# Patient Record
(demographics unavailable — no encounter records)

---

## 2024-10-24 NOTE — CARDIOLOGY SUMMARY
[de-identified] : nsr low voltage complexes October 24, 2024. [de-identified] : CT of the brain.  September 2024.  No acute event.

## 2024-10-24 NOTE — DISCUSSION/SUMMARY
[FreeTextEntry1] : 39-year-old with above medical history active medical problems as noted below 1.  Loss of consciousness.  Possible cardiovascular significantly.  Possible  migraine related event. EKG no significant conduction system abnormality.  No orthostasis.  Prior history of syncopal event in situation of bleeding.  She has baseline anemia.  And lower blood pressure. Possibility of vasovagal physiology along with migraine headache cannot be ruled out. At present we will rule out any significant cardiac arrhythmias with 2 weeks event monitor Echocardiogram for LV ejection fraction wall motion and ruling out any structural heart disease. Exercise treadmill stress test to assess evaluate blood pressure heart rate response and rule out any exercise related arrhythmias. Overall recommended Hydration Counter measure activity and exercise lying down when recurrence of similar symptoms and avoidance of situations which can stimulate were reviewed. Complete evaluation management by neurologist.  Based on about test we will discuss further if any other evaluation management needs to be done. Long-term preventive evaluation should include lipid panel if it is not done.  Counseling regarding low saturated fat,salt and carbohydrate intake was reviewed. Active lifestyle and regular exercise  along with weight management is advised. I have reviewed above at length. I answered all the questions. Patient verbalized understandings. Thank you very much for allowing me to participate in your patient's care. Please feel free to call me for any questions. Sincerely,  Kendy Jarrell MD, FACC, MARCE [EKG obtained to assist in diagnosis and management of assessed problem(s)] : EKG obtained to assist in diagnosis and management of assessed problem(s)

## 2024-10-24 NOTE — PHYSICAL EXAM
[Normal] : clear lung fields, good air entry, no respiratory distress [Normal Gait] : normal gait [No Edema] : no edema [Normal Speech] : normal speech [Alert and Oriented] : alert and oriented

## 2024-10-24 NOTE — ASSESSMENT
[FreeTextEntry1] : Reviewed on October 2024. EKG from today reviewed Labs from September 12, 2024.  CT scan of the brain from September 12, 2024 reviewed.  She had a normal CBC. Reviewed labs from October 7, 2024.  Anemia.  Sodium 140 potassium 3.8 creatinine 0.5.  SGOT PT 18 and 13.

## 2024-10-24 NOTE — CARDIOLOGY SUMMARY
[de-identified] : nsr low voltage complexes October 24, 2024. [de-identified] : CT of the brain.  September 2024.  No acute event.
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
## 2024-12-12 NOTE — CARDIOLOGY SUMMARY
[de-identified] : nsr low voltage complexes October 24, 2024. [de-identified] : Event monitor.  14 days.  October 2024.  Normal sinus rhythm.  Rare ectopy.  Brief PAT for 2 seconds.  Symptoms were related to sinus tachycardia at 111 average heart rate 76 minimum 48 [de-identified] : Exercise treadmill stress test 12/9/2024 9 minutes of Lele protocol 10 METS of workload.  No chest pain.  Negative for ischemia. [de-identified] : 11/29/2024 EF 60 to 65% mild MR. [de-identified] : CT of the brain.  September 2024.  No acute event.

## 2024-12-12 NOTE — ASSESSMENT
[FreeTextEntry1] : Reviewed on October 2024. EKG from today reviewed Labs from September 12, 2024.  CT scan of the brain from September 12, 2024 reviewed.  She had a normal CBC. Reviewed labs from October 7, 2024.  Anemia.  Sodium 140 potassium 3.8 creatinine 0.5.  SGOT PT 18 and 13.  Reviewed on December 12, 2024.  Event monitor, echocardiogram, exercise treadmill stress test were all reviewed.

## 2024-12-12 NOTE — DISCUSSION/SUMMARY
[FreeTextEntry1] : 39-year-old with above medical history active medical problems as noted below 1.  Loss of consciousness.   Syncope. EKG no significant conduction system abnormality.  No orthostasis.  Prior history of syncopal event in situation of bleeding.  She has baseline anemia.  And lower blood pressure.  Now with preserved EF.  Nonischemic exercise treadmill stress test.  Event monitor with no significant high risk arrhythmias. Possibility of vasovagal physiology along with migraine headache cannot be ruled out. Good prognostic information reviewed. Overall recommended Hydration Counter measure activity and exercise lying down when recurrence of similar symptoms and avoidance of situations which can stimulate vagal responses were reviewed. If recurrent event further evaluation management would include tilt table testing, implantable loop recorder/EP evaluation Complete evaluation management by neurologist. 2. lower BP at rest.  hydration . unrestricted salt.  She will benefit from lipid panel if it is not done for long-term preventative evaluation management.  Counseling regarding low saturated fat,salt and carbohydrate intake was reviewed. Active lifestyle and regular exercise  along with weight management is advised. I have reviewed above at length. I answered all the questions. Patient verbalized understandings. Thank you very much for allowing me to participate in your patient's care. Please feel free to call me for any questions. Sincerely,  Kendy Jarrell MD, FACC, MARCE
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
## 2025-07-02 NOTE — PHYSICAL EXAM
[General Appearance - Alert] : alert [General Appearance - In No Acute Distress] : in no acute distress [Oriented To Time, Place, And Person] : oriented to person, place, and time [Affect] : the affect was normal [Cranial Nerves Oculomotor (III)] : extraocular motion intact [Cranial Nerves Trigeminal (V)] : facial sensation intact symmetrically [Cranial Nerves Facial (VII)] : face symmetrical [Cranial Nerves Vestibulocochlear (VIII)] : hearing was intact bilaterally [Motor Strength] : muscle strength was normal in all four extremities [Sensation Tactile Decrease] : light touch was intact [Abnormal Walk] : normal gait [Extraocular Movements] : extraocular movements were intact [Hearing Threshold Finger Rub Not Spink] : hearing was normal [Neck Appearance] : the appearance of the neck was normal [] : no respiratory distress [Heart Rate And Rhythm] : heart rate was normal and rhythm regular [Motor Tone] : muscle strength and tone were normal [Skin Color & Pigmentation] : normal skin color and pigmentation

## 2025-07-02 NOTE — PHYSICAL EXAM
[General Appearance - Alert] : alert [General Appearance - In No Acute Distress] : in no acute distress [Oriented To Time, Place, And Person] : oriented to person, place, and time [Affect] : the affect was normal [Cranial Nerves Oculomotor (III)] : extraocular motion intact [Cranial Nerves Trigeminal (V)] : facial sensation intact symmetrically [Cranial Nerves Facial (VII)] : face symmetrical [Cranial Nerves Vestibulocochlear (VIII)] : hearing was intact bilaterally [Motor Strength] : muscle strength was normal in all four extremities [Sensation Tactile Decrease] : light touch was intact [Abnormal Walk] : normal gait [Extraocular Movements] : extraocular movements were intact [Hearing Threshold Finger Rub Not Sebastian] : hearing was normal [Neck Appearance] : the appearance of the neck was normal [] : no respiratory distress [Heart Rate And Rhythm] : heart rate was normal and rhythm regular [Motor Tone] : muscle strength and tone were normal [Skin Color & Pigmentation] : normal skin color and pigmentation

## 2025-07-10 NOTE — END OF VISIT
[FreeTextEntry3] : Ms. 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  This was found incidentally on a screening test done for her aneurysms due to a family history.  She also mentioned that she has a bony lesion above her left ear which appears to be an osteoma on her CAT scan.  She would like to have this lesion removed as it is growing over the last 10 years and now appears to be visible.  The risk, benefits and alternatives of resection of this lesion were discussed with the patient and she requested to proceed.  She is tentatively scheduled for July 30, 2025.  We also discussed conservative management of her meningioma with a repeat MRI in 3 months.  I, Dr. Tomy Leong, personally performed the evaluation and management (E/M) services for this new patient who presents today with (a) new problem(s)/exacerbation of (an) existing condition(s). That E/M includes conducting the examination, assessing all new/exacerbated conditions, and establishing a new plan of care. Today, my ACP, Ernestina Rivero, was here to observe my evaluation and management services for this new problem/exacerbated condition to be followed going forward.  [Time Spent: ___ minutes] : I have spent [unfilled] minutes of time on the encounter which excludes teaching and separately reported services.
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
## 2025-07-10 NOTE — ASSESSMENT
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   Plan: - Osteoma: patient opting for resection, tentatively planned for Wednesday July 30, 2025, will require PST and medical clearance prior - Meningioma: repeat MRI w/wo in 3 months (September 2025), follow up after imaging performed  - Patient in agreement with plan
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
